# Patient Record
Sex: FEMALE | Race: WHITE | URBAN - METROPOLITAN AREA
[De-identification: names, ages, dates, MRNs, and addresses within clinical notes are randomized per-mention and may not be internally consistent; named-entity substitution may affect disease eponyms.]

---

## 2024-08-29 ENCOUNTER — OFFICE VISIT (OUTPATIENT)
Facility: CLINIC | Age: 49
End: 2024-08-29
Payer: COMMERCIAL

## 2024-08-29 ENCOUNTER — LAB ENCOUNTER (OUTPATIENT)
Dept: LAB | Age: 49
End: 2024-08-29
Attending: FAMILY MEDICINE
Payer: COMMERCIAL

## 2024-08-29 ENCOUNTER — EKG ENCOUNTER (OUTPATIENT)
Dept: LAB | Age: 49
End: 2024-08-29
Attending: FAMILY MEDICINE
Payer: COMMERCIAL

## 2024-08-29 VITALS
BODY MASS INDEX: 23.89 KG/M2 | WEIGHT: 143.38 LBS | DIASTOLIC BLOOD PRESSURE: 66 MMHG | HEIGHT: 65 IN | SYSTOLIC BLOOD PRESSURE: 96 MMHG | HEART RATE: 79 BPM | OXYGEN SATURATION: 97 %

## 2024-08-29 DIAGNOSIS — R73.03 PREDIABETES: ICD-10-CM

## 2024-08-29 DIAGNOSIS — R06.02 SHORTNESS OF BREATH: ICD-10-CM

## 2024-08-29 DIAGNOSIS — Z00.00 ANNUAL VISIT FOR GENERAL ADULT MEDICAL EXAMINATION WITHOUT ABNORMAL FINDINGS: ICD-10-CM

## 2024-08-29 DIAGNOSIS — R06.02 SHORTNESS OF BREATH: Primary | ICD-10-CM

## 2024-08-29 DIAGNOSIS — R92.343 EXTREMELY DENSE TISSUE OF BOTH BREASTS ON MAMMOGRAPHY: ICD-10-CM

## 2024-08-29 DIAGNOSIS — Z00.00 ANNUAL VISIT FOR GENERAL ADULT MEDICAL EXAMINATION WITHOUT ABNORMAL FINDINGS: Primary | ICD-10-CM

## 2024-08-29 DIAGNOSIS — E03.9 ACQUIRED HYPOTHYROIDISM: ICD-10-CM

## 2024-08-29 DIAGNOSIS — B00.1 HERPES LABIALIS: ICD-10-CM

## 2024-08-29 LAB
ALBUMIN SERPL-MCNC: 4.4 G/DL (ref 3.2–4.8)
ALBUMIN/GLOB SERPL: 1.6 {RATIO} (ref 1–2)
ALP LIVER SERPL-CCNC: 51 U/L
ALT SERPL-CCNC: 24 U/L
ANION GAP SERPL CALC-SCNC: 6 MMOL/L (ref 0–18)
AST SERPL-CCNC: 32 U/L (ref ?–34)
ATRIAL RATE: 416 BPM
BASOPHILS # BLD AUTO: 0.09 X10(3) UL (ref 0–0.2)
BASOPHILS NFR BLD AUTO: 0.9 %
BILIRUB SERPL-MCNC: 0.7 MG/DL (ref 0.3–1.2)
BUN BLD-MCNC: 11 MG/DL (ref 9–23)
BUN/CREAT SERPL: 12.4 (ref 10–20)
CALCIUM BLD-MCNC: 9.7 MG/DL (ref 8.7–10.4)
CHLORIDE SERPL-SCNC: 105 MMOL/L (ref 98–112)
CHOLEST SERPL-MCNC: 149 MG/DL (ref ?–200)
CO2 SERPL-SCNC: 28 MMOL/L (ref 21–32)
CREAT BLD-MCNC: 0.89 MG/DL
DEPRECATED RDW RBC AUTO: 41.3 FL (ref 35.1–46.3)
EGFRCR SERPLBLD CKD-EPI 2021: 79 ML/MIN/1.73M2 (ref 60–?)
EOSINOPHIL # BLD AUTO: 0.39 X10(3) UL (ref 0–0.7)
EOSINOPHIL NFR BLD AUTO: 4 %
ERYTHROCYTE [DISTWIDTH] IN BLOOD BY AUTOMATED COUNT: 12.1 % (ref 11–15)
EST. AVERAGE GLUCOSE BLD GHB EST-MCNC: 111 MG/DL (ref 68–126)
FASTING PATIENT LIPID ANSWER: NO
FASTING STATUS PATIENT QL REPORTED: NO
GLOBULIN PLAS-MCNC: 2.7 G/DL (ref 2–3.5)
GLUCOSE BLD-MCNC: 77 MG/DL (ref 70–99)
HBA1C MFR BLD: 5.5 % (ref ?–5.7)
HCT VFR BLD AUTO: 38.1 %
HDLC SERPL-MCNC: 53 MG/DL (ref 40–59)
HGB BLD-MCNC: 12.6 G/DL
IMM GRANULOCYTES # BLD AUTO: 0.02 X10(3) UL (ref 0–1)
IMM GRANULOCYTES NFR BLD: 0.2 %
LDLC SERPL CALC-MCNC: 84 MG/DL (ref ?–100)
LYMPHOCYTES # BLD AUTO: 1.38 X10(3) UL (ref 1–4)
LYMPHOCYTES NFR BLD AUTO: 14.3 %
MCH RBC QN AUTO: 30.5 PG (ref 26–34)
MCHC RBC AUTO-ENTMCNC: 33.1 G/DL (ref 31–37)
MCV RBC AUTO: 92.3 FL
MONOCYTES # BLD AUTO: 0.56 X10(3) UL (ref 0.1–1)
MONOCYTES NFR BLD AUTO: 5.8 %
NEUTROPHILS # BLD AUTO: 7.21 X10 (3) UL (ref 1.5–7.7)
NEUTROPHILS # BLD AUTO: 7.21 X10(3) UL (ref 1.5–7.7)
NEUTROPHILS NFR BLD AUTO: 74.8 %
NONHDLC SERPL-MCNC: 96 MG/DL (ref ?–130)
OSMOLALITY SERPL CALC.SUM OF ELEC: 286 MOSM/KG (ref 275–295)
P AXIS: -16 DEGREES
P-R INTERVAL: 162 MS
PLATELET # BLD AUTO: 250 10(3)UL (ref 150–450)
POTASSIUM SERPL-SCNC: 4.2 MMOL/L (ref 3.5–5.1)
PROT SERPL-MCNC: 7.1 G/DL (ref 5.7–8.2)
Q-T INTERVAL: 424 MS
QRS DURATION: 80 MS
QTC CALCULATION (BEZET): 433 MS
R AXIS: 13 DEGREES
RBC # BLD AUTO: 4.13 X10(6)UL
SODIUM SERPL-SCNC: 139 MMOL/L (ref 136–145)
T AXIS: 23 DEGREES
TRIGL SERPL-MCNC: 57 MG/DL (ref 30–149)
TSI SER-ACNC: 2.81 MIU/ML (ref 0.55–4.78)
VENTRICULAR RATE: 63 BPM
VLDLC SERPL CALC-MCNC: 9 MG/DL (ref 0–30)
WBC # BLD AUTO: 9.7 X10(3) UL (ref 4–11)

## 2024-08-29 PROCEDURE — 3008F BODY MASS INDEX DOCD: CPT | Performed by: FAMILY MEDICINE

## 2024-08-29 PROCEDURE — 80050 GENERAL HEALTH PANEL: CPT | Performed by: FAMILY MEDICINE

## 2024-08-29 PROCEDURE — 80061 LIPID PANEL: CPT | Performed by: FAMILY MEDICINE

## 2024-08-29 PROCEDURE — 99386 PREV VISIT NEW AGE 40-64: CPT | Performed by: FAMILY MEDICINE

## 2024-08-29 PROCEDURE — 3074F SYST BP LT 130 MM HG: CPT | Performed by: FAMILY MEDICINE

## 2024-08-29 PROCEDURE — 3078F DIAST BP <80 MM HG: CPT | Performed by: FAMILY MEDICINE

## 2024-08-29 PROCEDURE — 93005 ELECTROCARDIOGRAM TRACING: CPT

## 2024-08-29 PROCEDURE — 93010 ELECTROCARDIOGRAM REPORT: CPT | Performed by: STUDENT IN AN ORGANIZED HEALTH CARE EDUCATION/TRAINING PROGRAM

## 2024-08-29 PROCEDURE — 99203 OFFICE O/P NEW LOW 30 MIN: CPT | Performed by: FAMILY MEDICINE

## 2024-08-29 PROCEDURE — 83036 HEMOGLOBIN GLYCOSYLATED A1C: CPT | Performed by: FAMILY MEDICINE

## 2024-08-29 RX ORDER — LEVOTHYROXINE SODIUM 50 UG/1
50 TABLET ORAL EVERY MORNING
Qty: 90 TABLET | Refills: 3 | Status: SHIPPED | OUTPATIENT
Start: 2024-08-29

## 2024-08-29 RX ORDER — VALACYCLOVIR HYDROCHLORIDE 1 G/1
2000 TABLET, FILM COATED ORAL AS NEEDED
COMMUNITY
Start: 2023-09-07 | End: 2024-08-29

## 2024-08-29 RX ORDER — LACTOBACILLUS ACIDOPHILUS 500MM CELL
1 CAPSULE ORAL DAILY
COMMUNITY

## 2024-08-29 RX ORDER — LEVOTHYROXINE SODIUM 50 UG/1
1 TABLET ORAL EVERY MORNING
COMMUNITY
Start: 2023-09-07 | End: 2024-08-29

## 2024-08-29 RX ORDER — VALACYCLOVIR HYDROCHLORIDE 1 G/1
2000 TABLET, FILM COATED ORAL AS NEEDED
Qty: 21 TABLET | Refills: 3 | Status: SHIPPED | OUTPATIENT
Start: 2024-08-29

## 2024-08-29 NOTE — PROGRESS NOTES
Subjective:   Shona Pulido is a 49 year old female who presents for Establish Care (Patient is at the office to establish care. ) and Physical (Patient is requesting annual physical exam. )     Perimenopause/PMDD  2-3 months ago had very heavy period bled through tampons every hour. Was also having suicidal ideation during this period. Has seen psychiatrist and ob/gyn since this period. Ob/gyn is planning on getting a hystosonogram and ultrasound. Discussed HRT versus SSRI. Psychiatrist also discussed HRT vs low dose prozac. Patient is also having hot flashes and nightsweats.     Prediabetes  noted on 2023 labs A1c 5.7    Hypothyroidism  On levo 50 mcg    Herpes labialis  On as needed valacyclovir 1 g Take 2 tablets by mouth every 12 hours. For two doses     Patient is very active. Exercises multiple times a week. However within the past month has noted more shortness of breath and fatigue. No chest pain or lightheadedness surrounding exercise. Has never had stress test. Has had EKG in the past due to family history of WPW.     Health maintenance  Colonoscopy 4/2023  Per chart review: Has Tyer-cuzick score of 20.91% was recommended to see high risk breast with previous provider, has extremely dense breasts and known left breast mass (benign)--> Ob/gyn potentially ordered breast MRI    History/Other:    Chief Complaint Reviewed and Verified  Nursing Notes Reviewed and   Verified  Tobacco Reviewed  Allergies Reviewed  Medications Reviewed    Problem List Reviewed  Medical History Reviewed  Surgical History   Reviewed  Family History Reviewed  Social History Reviewed             Current Outpatient Medications   Medication Sig Dispense Refill    acidophilus lactobacillus Oral Cap Take 1 capsule by mouth daily.      Cholecalciferol 50 MCG (2000 UT) Oral Tab Take 1 tablet (2,000 Units total) by mouth daily.      Multiple Vitamin (MULTIVITAMIN ADULT OR) Take by mouth As Directed.      levothyroxine 50 MCG  Oral Tab Take 1 tablet (50 mcg total) by mouth every morning. 90 tablet 3    valACYclovir 1 G Oral Tab Take 2 tablets (2,000 mg total) by mouth as needed. 21 tablet 3         Review of Systems:  Review of Systems   Constitutional: Negative.    HENT: Negative.     Eyes: Negative.    Respiratory:  Positive for shortness of breath.    Cardiovascular: Negative.    Gastrointestinal: Negative.    Genitourinary: Negative.    Musculoskeletal: Negative.    Skin: Negative.    Neurological: Negative.    Psychiatric/Behavioral: Negative.         Objective:   BP 96/66 (BP Location: Left arm, Patient Position: Sitting, Cuff Size: adult)   Pulse 79   Ht 5' 5\" (1.651 m)   Wt 143 lb 6 oz (65 kg)   LMP 08/28/2024   SpO2 97%   BMI 23.86 kg/m²  Estimated body mass index is 23.86 kg/m² as calculated from the following:    Height as of this encounter: 5' 5\" (1.651 m).    Weight as of this encounter: 143 lb 6 oz (65 kg).  Physical Exam  Vitals and nursing note reviewed.   Constitutional:       General: She is not in acute distress.     Appearance: Normal appearance. She is not ill-appearing.   HENT:      Head: Normocephalic and atraumatic.      Right Ear: Tympanic membrane normal. There is no impacted cerumen.      Left Ear: Tympanic membrane normal. There is no impacted cerumen.      Mouth/Throat:      Mouth: Mucous membranes are moist.      Pharynx: Oropharynx is clear. No oropharyngeal exudate or posterior oropharyngeal erythema.   Eyes:      General:         Right eye: No discharge.         Left eye: No discharge.      Extraocular Movements: Extraocular movements intact.      Pupils: Pupils are equal, round, and reactive to light.   Cardiovascular:      Rate and Rhythm: Normal rate and regular rhythm.      Heart sounds: Normal heart sounds. No murmur heard.     No friction rub. No gallop.   Pulmonary:      Effort: Pulmonary effort is normal.      Breath sounds: Normal breath sounds. No wheezing, rhonchi or rales.   Abdominal:       General: Abdomen is flat. Bowel sounds are normal. There is no distension.      Palpations: Abdomen is soft. There is no mass.      Tenderness: There is no abdominal tenderness. There is no guarding or rebound.   Musculoskeletal:         General: Normal range of motion.      Cervical back: Normal range of motion.      Right lower leg: No edema.      Left lower leg: No edema.   Skin:     General: Skin is warm and dry.      Findings: No rash.   Neurological:      General: No focal deficit present.      Mental Status: She is alert. Mental status is at baseline.   Psychiatric:         Mood and Affect: Mood normal.         Behavior: Behavior normal.           Assessment & Plan:   1. Annual visit for general adult medical examination without abnormal findings (Primary)  -     CBC With Differential With Platelet; Future; Expected date: 08/29/2024  -     Comp Metabolic Panel (14); Future; Expected date: 08/29/2024  -     Hemoglobin A1C; Future; Expected date: 08/29/2024  -     TSH W Reflex To Free T4; Future; Expected date: 08/29/2024  -     Lipid Panel; Future; Expected date: 08/29/2024    -ordered annual labs  Colon cancer screening up to date  -patient will make sure ob/gyn has ordered breast MRI. If they have not patient may contact provider and will order    2. Prediabetes  -     Hemoglobin A1C; Future; Expected date: 08/29/2024    -ordered updated A1c    3. Acquired hypothyroidism    Orders:  -     TSH W Reflex To Free T4; Future; Expected date: 08/29/2024  -     Levothyroxine Sodium; Take 1 tablet (50 mcg total) by mouth every morning.  Dispense: 90 tablet; Refill: 3  -refilled levothyroxine. No dose changes for now. Ordered updated TSH    4. Extremely dense tissue of both breasts on mammography    -patient will make sure ob/gyn has ordered breast MRI. If they have not patient may contact provider and will order    5. Herpes labialis  -     valACYclovir HCl; Take 2 tablets (2,000 mg total) by mouth as needed.   Dispense: 21 tablet; Refill: 3    -well controlled on valcyclovir as needed. refilled    6. Shortness of breath  -     EKG 12 Lead; Future; Expected date: 08/29/2024    -ordered labs as above to investigate thyroid, anemia and other metabolic derangement etiologies. Also ordered EKG. Pending results will consider stress test for patient    Return in about 1 year (around 8/29/2025), or if symptoms worsen or fail to improve.    Terri Jauregui MD, 8/29/2024, 8:12 AM

## 2024-08-29 NOTE — PATIENT INSTRUCTIONS
Consider [employer] brand of supplements    Total daily intake: 25 mcg of Vitamin D, 1,000 Ius    Calcium daily intake should be no more than 1200 mg daily

## 2024-09-05 ENCOUNTER — HOSPITAL ENCOUNTER (OUTPATIENT)
Dept: CV DIAGNOSTICS | Facility: HOSPITAL | Age: 49
Discharge: HOME | End: 2024-09-05
Attending: FAMILY MEDICINE
Payer: COMMERCIAL

## 2024-09-05 DIAGNOSIS — R06.02 SHORTNESS OF BREATH: ICD-10-CM

## 2024-09-05 LAB
% OF MAX PREDICTED HR: 100 %
MAX DIASTOLIC BP: 82 MMHG
MAX HEART RATE: 176 BPM
MAX PREDICTED HEART RATE: 170 BPM
MAX SYSTOLIC BP: 160 MMHG
MAX WORK LOAD: 176

## 2024-09-05 PROCEDURE — 93017 CV STRESS TEST TRACING ONLY: CPT | Performed by: FAMILY MEDICINE

## 2024-09-05 PROCEDURE — 93018 CV STRESS TEST I&R ONLY: CPT | Performed by: INTERNAL MEDICINE

## 2024-09-05 PROCEDURE — 93016 CV STRESS TEST SUPVJ ONLY: CPT | Performed by: INTERNAL MEDICINE

## 2024-10-28 DIAGNOSIS — B00.1 HERPES LABIALIS: ICD-10-CM

## 2024-10-29 ENCOUNTER — PATIENT MESSAGE (OUTPATIENT)
Facility: CLINIC | Age: 49
End: 2024-10-29

## 2024-10-29 RX ORDER — VALACYCLOVIR HYDROCHLORIDE 1 G/1
2000 TABLET, FILM COATED ORAL AS NEEDED
Qty: 21 TABLET | Refills: 3 | OUTPATIENT
Start: 2024-10-29

## 2024-10-29 NOTE — TELEPHONE ENCOUNTER
Refills at [facility]  Outpatient Medication Detail     Disp Refills Start End    valACYclovir 1 G Oral Tab 21 tablet 3 8/29/2024 --    Sig - Route: Take 2 tablets (2,000 mg total) by mouth as needed. - Oral    Sent to pharmacy as: valACYclovir HCl 1 GM Oral Tablet (Valtrex)    E-Prescribing Status: Receipt confirmed by pharmacy (8/29/2024  8:27 AM CDT)      Associated Diagnoses    Herpes labialis        Pharmacy    [id number] - [address] AT [facility], [phone], [phone]

## 2024-10-30 NOTE — TELEPHONE ENCOUNTER
I  called [facility] in [place] and they have the script but was never filled..  Per the pharmacy it will be read at 3:00 pm today.   The patient was notified via [employer].

## 2025-03-08 ENCOUNTER — HOSPITAL ENCOUNTER (OUTPATIENT)
Age: 50
Discharge: HOME | End: 2025-03-08
Payer: COMMERCIAL

## 2025-03-08 VITALS
HEART RATE: 71 BPM | RESPIRATION RATE: 20 BRPM | OXYGEN SATURATION: 99 % | TEMPERATURE: 98 F | DIASTOLIC BLOOD PRESSURE: 77 MMHG | SYSTOLIC BLOOD PRESSURE: 124 MMHG

## 2025-03-08 DIAGNOSIS — R59.9 REACTIVE LYMPHADENOPATHY: Primary | ICD-10-CM

## 2025-03-08 DIAGNOSIS — Z86.19 HX OF HERPES LABIALIS: ICD-10-CM

## 2025-03-08 NOTE — ED PROVIDER NOTES
Patient Seen in: [facility]      History     Chief Complaint   Patient presents with    Ear Pain    Tooth Ache     Stated Complaint: Sore Throat - swollen lymph, tooth pain, ear pain, throat issue    Subjective:   HPI      Patient is a 49-year-old female that presents to immediate care due to throat issue x 1 week.  Patient states that she developed dental pain earlier this week.  Was just seen by dentist and had reassuring x-ray showing no cavities.  Patient states that she has intermittent facial pain radiating to her left ear.  Patient also states that today she developed a tingling sensation over her lips.  States that she normally has  this sensation when she is about to develop a cold sore.  Denies fever, sore throat, difficulty swallowing breathing.    Objective:     Past Medical History:    Allergic rhinitis    Depression    cycle related=PMDD    Esophageal reflux    Hyperthyroidism    Hypothyroidism    PMDD (premenstrual dysphoric disorder)    Raynaud's disease              Past Surgical History:   Procedure Laterality Date    Colonoscopy      Hemorrhoidectomy      Hernia surgery      Leep            Other surgical history      LEE    Skin surgery      Tonsillectomy      Umbilical hernia repair                  No pertinent social history.            Review of Systems    Positive for stated complaint: Sore Throat - swollen lymph, tooth pain, ear pain, throat issue  Other systems are as noted in HPI.  Constitutional and vital signs reviewed.      All other systems reviewed and negative except as noted above.    Physical Exam     ED Triage Vitals [25 1120]   /77   Pulse 71   Resp 20   Temp 97.8 °F (36.6 °C)   Temp src Oral   SpO2 99 %   O2 Device None (Room air)       Current Vitals:   Vital Signs  BP: 124/77  Pulse: 71  Resp: 20  Temp: 97.8 °F (36.6 °C)  Temp src: Oral    Oxygen Therapy  SpO2: 99 %  O2 Device: None (Room air)        Physical Exam  Vital signs reviewed.  Nursing note reviewed.  Constitutional: Well-developed. Well-nourished. In no acute distress  HENT: Mucous membranes moist.  No trismus.  Uvula midline.  No posterior pharynx edema erythema exudates.  TMs intact bilaterally.  EYES: No scleral icterus or conjunctival injection.  NECK: Full ROM. Supple.  Mildly but not lymphadenopathy along the anterior left cervical chain.  No overlying edema erythema warmth.  CARDIAC: Normal rate.   PULM/CHEST:  No wheezes  Extremities: Full ROM  NEURO: Awake, alert, following commands, moving extremities, answering questions.   SKIN: Warm and dry. No rash or lesions.  PSYCH: Normal judgment. Normal affect.             MDM      Patient is a 49-year-old female who presents to immediate care due to left-sided facial pain over the past week.  Patient arrives with stable vitals.  Physical exam showing no obvious abnormality with TMs intact bilaterally no facial edema or posterior pharynx edema.  Unlikely otitis media, trigeminal neuralgia, strep pharyngitis, lymphangitis, dental abscess, PTA or retropharyngeal abscess.  Most likely prodrome to herpes labialis versus reactive lymph node.  Consider prescription for Valtrex however patient states that she picked up a new prescription this morning prescribed by outside provider.  Encouraged patient to take prescribed medication.  Return precautions discussed including increased pain fever or development of facial edema difficulty swallowing.,        Medical Decision Making      Disposition and Plan     Clinical Impression:  1. Reactive lymphadenopathy    2. Hx of herpes labialis         Disposition:  Discharge  3/8/2025 11:54 am    Follow-up:  Terri Jauregui MD  [address] [address]  [phone]    Call             Medications Prescribed:  Discharge Medication List as of 3/8/2025 11:57 AM              Supplementary Documentation:

## 2025-03-08 NOTE — ED INITIAL ASSESSMENT (HPI)
Pt came in due to left side ear pain, left side toothache, and pt stated she noticed a small growth in back of her throat. Pt has easy non labored respirations.